# Patient Record
Sex: FEMALE | Race: BLACK OR AFRICAN AMERICAN | URBAN - NONMETROPOLITAN AREA
[De-identification: names, ages, dates, MRNs, and addresses within clinical notes are randomized per-mention and may not be internally consistent; named-entity substitution may affect disease eponyms.]

---

## 2021-01-12 ENCOUNTER — WEB ENCOUNTER (OUTPATIENT)
Dept: URBAN - NONMETROPOLITAN AREA CLINIC 4 | Facility: CLINIC | Age: 51
End: 2021-01-12

## 2021-01-12 ENCOUNTER — OFFICE VISIT (OUTPATIENT)
Dept: URBAN - NONMETROPOLITAN AREA CLINIC 4 | Facility: CLINIC | Age: 51
End: 2021-01-12
Payer: MEDICARE

## 2021-01-12 VITALS
SYSTOLIC BLOOD PRESSURE: 103 MMHG | DIASTOLIC BLOOD PRESSURE: 71 MMHG | BODY MASS INDEX: 32.56 KG/M2 | HEART RATE: 80 BPM | TEMPERATURE: 97.5 F | WEIGHT: 202.6 LBS | HEIGHT: 66 IN

## 2021-01-12 DIAGNOSIS — K44.9 HIATAL HERNIA: ICD-10-CM

## 2021-01-12 DIAGNOSIS — M34.9 SCLERODERMA: ICD-10-CM

## 2021-01-12 DIAGNOSIS — K21.9 GERD: ICD-10-CM

## 2021-01-12 PROBLEM — 235595009 GASTROESOPHAGEAL REFLUX DISEASE: Status: ACTIVE | Noted: 2021-01-12

## 2021-01-12 PROCEDURE — G9903 PT SCRN TBCO ID AS NON USER: HCPCS | Performed by: INTERNAL MEDICINE

## 2021-01-12 PROCEDURE — 99213 OFFICE O/P EST LOW 20 MIN: CPT | Performed by: INTERNAL MEDICINE

## 2021-01-12 PROCEDURE — 3017F COLORECTAL CA SCREEN DOC REV: CPT | Performed by: INTERNAL MEDICINE

## 2021-01-12 PROCEDURE — G8482 FLU IMMUNIZE ORDER/ADMIN: HCPCS | Performed by: INTERNAL MEDICINE

## 2021-01-12 PROCEDURE — G8417 CALC BMI ABV UP PARAM F/U: HCPCS | Performed by: INTERNAL MEDICINE

## 2021-01-12 PROCEDURE — G8427 DOCREV CUR MEDS BY ELIG CLIN: HCPCS | Performed by: INTERNAL MEDICINE

## 2021-01-12 PROCEDURE — 1036F TOBACCO NON-USER: CPT | Performed by: INTERNAL MEDICINE

## 2021-01-12 RX ORDER — HYDROXYCHLOROQUINE SULFATE 200 MG/1
TABLET ORAL
Qty: 0 | Refills: 0 | Status: ACTIVE | COMMUNITY
Start: 1900-01-01

## 2021-01-12 RX ORDER — PANTOPRAZOLE SODIUM 40 MG/1
1 TABLET TABLET, DELAYED RELEASE ORAL ONCE A DAY
Status: ACTIVE | COMMUNITY

## 2021-01-12 NOTE — HPI-TODAY'S VISIT:
51 yo female with hx of scleroderma and hiatal hernia /gerd with neg c scope in 2015 due in 2022 returns for f/u visit. Last EGD 2019 with 3.0 cm hiatal hernia and gastritis. Hx of remote esophageal candidiasis as well.  Pt reports that her reflux has been under fairly good control.  Pt reports that she has only occasional breakthrough.   Pt report that sh takes her pantoprazole 40 mg po daily.  Pt report that she has had stable disease with slow progression and remains on cellcept/plaquenil/prednisone and ofev.

## 2022-01-13 ENCOUNTER — OFFICE VISIT (OUTPATIENT)
Dept: URBAN - NONMETROPOLITAN AREA CLINIC 4 | Facility: CLINIC | Age: 52
End: 2022-01-13
Payer: MEDICARE

## 2022-01-13 ENCOUNTER — WEB ENCOUNTER (OUTPATIENT)
Dept: URBAN - NONMETROPOLITAN AREA CLINIC 4 | Facility: CLINIC | Age: 52
End: 2022-01-13

## 2022-01-13 DIAGNOSIS — M34.9 SCLERODERMA: ICD-10-CM

## 2022-01-13 DIAGNOSIS — K21.9 GERD WITHOUT ESOPHAGITIS: ICD-10-CM

## 2022-01-13 PROBLEM — 422801000 SCLERODERMA: Status: ACTIVE | Noted: 2021-01-12

## 2022-01-13 PROCEDURE — 99213 OFFICE O/P EST LOW 20 MIN: CPT | Performed by: INTERNAL MEDICINE

## 2022-01-13 RX ORDER — PANTOPRAZOLE SODIUM 40 MG/1
1 TABLET TABLET, DELAYED RELEASE ORAL ONCE A DAY
Status: ACTIVE | COMMUNITY

## 2022-01-13 RX ORDER — HYDROXYCHLOROQUINE SULFATE 200 MG/1
TABLET ORAL
Qty: 0 | Refills: 0 | Status: ACTIVE | COMMUNITY

## 2022-01-13 NOTE — HPI-TODAY'S VISIT:
Patient reports that her swallowing is normal. Pt makes sure she chews her food well.   Regarding her scleroderma, she has lungs which are the same.  Pt reports that she is taking Ofev, small molecule for lung fibrosis. Sees pulm q6 ms and Rheum q4 ms. Cardiac issues stable.  Pt is still taking protonix.  Pt reports that her prev iron and b12 issues have resolved.

## 2022-06-14 ENCOUNTER — APPOINTMENT (RX ONLY)
Dept: URBAN - METROPOLITAN AREA CLINIC 34 | Facility: CLINIC | Age: 52
Setting detail: DERMATOLOGY
End: 2022-06-14

## 2022-06-14 DIAGNOSIS — L81.4 OTHER MELANIN HYPERPIGMENTATION: ICD-10-CM

## 2022-06-14 PROCEDURE — ? PRESCRIPTION MEDICATION MANAGEMENT

## 2022-06-14 PROCEDURE — ? ADDITIONAL NOTES

## 2022-06-14 PROCEDURE — ? COUNSELING

## 2022-06-14 PROCEDURE — 99202 OFFICE O/P NEW SF 15 MIN: CPT

## 2022-06-14 ASSESSMENT — LOCATION SIMPLE DESCRIPTION DERM
LOCATION SIMPLE: RIGHT CHEEK
LOCATION SIMPLE: LEFT CHEEK

## 2022-06-14 ASSESSMENT — LOCATION ZONE DERM: LOCATION ZONE: FACE

## 2022-06-14 ASSESSMENT — LOCATION DETAILED DESCRIPTION DERM
LOCATION DETAILED: RIGHT SUPERIOR MEDIAL MALAR CHEEK
LOCATION DETAILED: LEFT SUPERIOR CENTRAL MALAR CHEEK

## 2022-06-14 NOTE — PROCEDURE: ADDITIONAL NOTES
Render Risk Assessment In Note?: no
Additional Notes: Patient denies hx of eczema or allergies. \\nDiscusses treatment options including retinol eye cream
Detail Level: Simple

## 2023-01-12 ENCOUNTER — OFFICE VISIT (OUTPATIENT)
Dept: URBAN - NONMETROPOLITAN AREA CLINIC 4 | Facility: CLINIC | Age: 53
End: 2023-01-12

## 2023-04-13 ENCOUNTER — OFFICE VISIT (OUTPATIENT)
Dept: URBAN - NONMETROPOLITAN AREA CLINIC 4 | Facility: CLINIC | Age: 53
End: 2023-04-13
Payer: MEDICARE

## 2023-04-13 ENCOUNTER — DASHBOARD ENCOUNTERS (OUTPATIENT)
Age: 53
End: 2023-04-13

## 2023-04-13 VITALS
HEIGHT: 66 IN | WEIGHT: 193.6 LBS | DIASTOLIC BLOOD PRESSURE: 64 MMHG | HEART RATE: 78 BPM | BODY MASS INDEX: 31.12 KG/M2 | TEMPERATURE: 97.7 F | SYSTOLIC BLOOD PRESSURE: 104 MMHG

## 2023-04-13 DIAGNOSIS — M34.9 SCLERODERMA: ICD-10-CM

## 2023-04-13 DIAGNOSIS — R68.2 DRY MOUTH: ICD-10-CM

## 2023-04-13 DIAGNOSIS — K21.9 GERD: ICD-10-CM

## 2023-04-13 DIAGNOSIS — K44.9 HIATAL HERNIA: ICD-10-CM

## 2023-04-13 PROCEDURE — 99213 OFFICE O/P EST LOW 20 MIN: CPT | Performed by: INTERNAL MEDICINE

## 2023-04-13 RX ORDER — PANTOPRAZOLE SODIUM 40 MG/1
1 TABLET TABLET, DELAYED RELEASE ORAL ONCE A DAY
Status: ACTIVE | COMMUNITY

## 2023-04-13 RX ORDER — HYDROXYCHLOROQUINE SULFATE 200 MG/1
TABLET ORAL
Qty: 0 | Refills: 0 | Status: ACTIVE | COMMUNITY

## 2023-04-13 NOTE — PHYSICAL EXAM CONSTITUTIONAL:
well developed, well nourished , in no acute distress , ambulating without difficulty , normal communication ability  cushingoid

## 2023-04-13 NOTE — HPI-TODAY'S VISIT:
Patient reports that her swallowing is normal. Pt makes sure she chews her food well.   Regarding her scleroderma, she has lungs which are the same.  Pt reports that she is taking Ofev, small molecule for lung fibrosis. Sees pulm q6 ms and Rheum q4 ms. Cardiac issues stable.  Pt is still taking protonix.  Pt reports that her prev iron and b12 issues have resolved.  4-13-23 Pt reports that if she is sleeping and her mouth gets dry she tries to clear her esophagus.  Pt reports that she is unsure if this is d/t sleeping or dryness.  Pt reports that the gerd is stable, watches what she eats and is well controlled.  Pt reports that she remains on ppi. On low dose prednisone.